# Patient Record
Sex: FEMALE | Race: BLACK OR AFRICAN AMERICAN | NOT HISPANIC OR LATINO | Employment: OTHER | URBAN - METROPOLITAN AREA
[De-identification: names, ages, dates, MRNs, and addresses within clinical notes are randomized per-mention and may not be internally consistent; named-entity substitution may affect disease eponyms.]

---

## 2019-03-13 ENCOUNTER — TELEPHONE (OUTPATIENT)
Dept: OTOLARYNGOLOGY | Facility: CLINIC | Age: 39
End: 2019-03-13

## 2019-03-13 NOTE — TELEPHONE ENCOUNTER
----- Message from Joanne Horn sent at 3/13/2019 11:14 AM CDT -----  Contact: speech ther-[facility]  [phone]-Please call above patient speech therapist need to speak with the nurse thanks

## 2019-03-25 ENCOUNTER — TELEPHONE (OUTPATIENT)
Dept: OTOLARYNGOLOGY | Facility: CLINIC | Age: 39
End: 2019-03-25

## 2019-03-25 NOTE — TELEPHONE ENCOUNTER
----- Message from Kaylene Mcintyre sent at 3/25/2019  2:39 PM CDT -----  Contact: Kaylynn Bruce [facility] out patient therapy clinic  Needs Advice    Reason for call: Kaylynn is calling to speak with the nurse to update the pts medical history and some other testing while the pt is there for her appt on April 24,2019        Communication Preference: can you please call Kaylynn at [phone]    Additional Information: none    ARCENIO